# Patient Record
Sex: FEMALE | Race: OTHER | NOT HISPANIC OR LATINO | Employment: UNEMPLOYED | URBAN - METROPOLITAN AREA
[De-identification: names, ages, dates, MRNs, and addresses within clinical notes are randomized per-mention and may not be internally consistent; named-entity substitution may affect disease eponyms.]

---

## 2022-11-18 DIAGNOSIS — M06.9 RHEUMATOID ARTHRITIS: Primary | ICD-10-CM

## 2023-01-12 ENCOUNTER — HOSPITAL ENCOUNTER (OUTPATIENT)
Dept: RADIOLOGY | Facility: OTHER | Age: 48
Discharge: HOME | End: 2023-01-12
Attending: INTERNAL MEDICINE
Payer: MEDICAID

## 2023-01-12 DIAGNOSIS — M13.0 POLYARTHRITIS, UNSPECIFIED: ICD-10-CM

## 2023-01-12 PROCEDURE — 72170 XR PELVIS ROUTINE AP: ICD-10-PCS | Mod: 26,,, | Performed by: RADIOLOGY

## 2023-01-12 PROCEDURE — 73620 X-RAY EXAM OF FOOT: CPT | Mod: 26,50,, | Performed by: RADIOLOGY

## 2023-01-12 PROCEDURE — 73620 XR FOOT AP BILAT: ICD-10-PCS | Mod: 26,50,, | Performed by: RADIOLOGY

## 2023-01-12 PROCEDURE — 72100 X-RAY EXAM L-S SPINE 2/3 VWS: CPT | Mod: 26,,, | Performed by: RADIOLOGY

## 2023-01-12 PROCEDURE — 72100 X-RAY EXAM L-S SPINE 2/3 VWS: CPT | Mod: TC,FY

## 2023-01-12 PROCEDURE — 73130 X-RAY EXAM OF HAND: CPT | Mod: TC,50,FY

## 2023-01-12 PROCEDURE — 73130 XR HAND COMPLETE 3 VIEWS BILATERAL: ICD-10-PCS | Mod: 26,50,, | Performed by: RADIOLOGY

## 2023-01-12 PROCEDURE — 72100 XR LUMBAR SPINE 2 OR 3 VIEWS: ICD-10-PCS | Mod: 26,,, | Performed by: RADIOLOGY

## 2023-01-12 PROCEDURE — 73130 X-RAY EXAM OF HAND: CPT | Mod: 26,50,, | Performed by: RADIOLOGY

## 2023-01-12 PROCEDURE — 72170 X-RAY EXAM OF PELVIS: CPT | Mod: TC,FY

## 2023-01-12 PROCEDURE — 72170 X-RAY EXAM OF PELVIS: CPT | Mod: 26,,, | Performed by: RADIOLOGY

## 2023-01-12 PROCEDURE — 73620 X-RAY EXAM OF FOOT: CPT | Mod: TC,50,FY

## 2023-02-25 ENCOUNTER — HOSPITAL ENCOUNTER (EMERGENCY)
Facility: OTHER | Age: 48
Discharge: HOME | End: 2023-02-26
Attending: EMERGENCY MEDICINE
Payer: MEDICAID

## 2023-02-25 DIAGNOSIS — H53.9 CHANGE IN VISION: ICD-10-CM

## 2023-02-25 DIAGNOSIS — I10 HYPERTENSION, UNSPECIFIED TYPE: ICD-10-CM

## 2023-02-25 DIAGNOSIS — R03.0 ELEVATED BLOOD PRESSURE READING: Primary | ICD-10-CM

## 2023-02-25 LAB
B-HCG UR QL: NEGATIVE
BILIRUB UR QL STRIP: NEGATIVE
CLARITY UR: CLEAR
COLOR UR: COLORLESS
CTP QC/QA: YES
GLUCOSE UR QL STRIP: NEGATIVE
HGB UR QL STRIP: NEGATIVE
KETONES UR QL STRIP: NEGATIVE
LEUKOCYTE ESTERASE UR QL STRIP: NEGATIVE
NITRITE UR QL STRIP: NEGATIVE
PH UR STRIP: 6 [PH] (ref 5–8)
PROT UR QL STRIP: NEGATIVE
SP GR UR STRIP: <1.005 (ref 1–1.03)
URN SPEC COLLECT METH UR: ABNORMAL
UROBILINOGEN UR STRIP-ACNC: NEGATIVE EU/DL

## 2023-02-25 PROCEDURE — 81003 URINALYSIS AUTO W/O SCOPE: CPT | Performed by: EMERGENCY MEDICINE

## 2023-02-25 PROCEDURE — 81025 URINE PREGNANCY TEST: CPT | Performed by: EMERGENCY MEDICINE

## 2023-02-25 PROCEDURE — 99285 EMERGENCY DEPT VISIT HI MDM: CPT

## 2023-02-26 VITALS
RESPIRATION RATE: 16 BRPM | TEMPERATURE: 99 F | OXYGEN SATURATION: 98 % | HEIGHT: 61 IN | SYSTOLIC BLOOD PRESSURE: 177 MMHG | HEART RATE: 65 BPM | DIASTOLIC BLOOD PRESSURE: 85 MMHG

## 2023-02-26 LAB
ALBUMIN SERPL BCP-MCNC: 4.3 G/DL (ref 3.5–5.2)
ALP SERPL-CCNC: 103 U/L (ref 55–135)
ALT SERPL W/O P-5'-P-CCNC: 23 U/L (ref 10–44)
ANION GAP SERPL CALC-SCNC: 14 MMOL/L (ref 8–16)
AST SERPL-CCNC: 18 U/L (ref 10–40)
BASOPHILS # BLD AUTO: 0.05 K/UL (ref 0–0.2)
BASOPHILS NFR BLD: 0.4 % (ref 0–1.9)
BILIRUB SERPL-MCNC: 0.5 MG/DL (ref 0.1–1)
BNP SERPL-MCNC: <10 PG/ML (ref 0–99)
BUN SERPL-MCNC: 12 MG/DL (ref 6–20)
CALCIUM SERPL-MCNC: 9.9 MG/DL (ref 8.7–10.5)
CHLORIDE SERPL-SCNC: 98 MMOL/L (ref 95–110)
CO2 SERPL-SCNC: 23 MMOL/L (ref 23–29)
CREAT SERPL-MCNC: 0.8 MG/DL (ref 0.5–1.4)
DIFFERENTIAL METHOD: ABNORMAL
EOSINOPHIL # BLD AUTO: 0.4 K/UL (ref 0–0.5)
EOSINOPHIL NFR BLD: 2.8 % (ref 0–8)
ERYTHROCYTE [DISTWIDTH] IN BLOOD BY AUTOMATED COUNT: 14.4 % (ref 11.5–14.5)
EST. GFR  (NO RACE VARIABLE): >60 ML/MIN/1.73 M^2
GLUCOSE SERPL-MCNC: 103 MG/DL (ref 70–110)
HCT VFR BLD AUTO: 32.4 % (ref 37–48.5)
HGB BLD-MCNC: 10.7 G/DL (ref 12–16)
IMM GRANULOCYTES # BLD AUTO: 0.03 K/UL (ref 0–0.04)
IMM GRANULOCYTES NFR BLD AUTO: 0.2 % (ref 0–0.5)
LIPASE SERPL-CCNC: 43 U/L (ref 4–60)
LYMPHOCYTES # BLD AUTO: 5.1 K/UL (ref 1–4.8)
LYMPHOCYTES NFR BLD: 38.6 % (ref 18–48)
MCH RBC QN AUTO: 26.8 PG (ref 27–31)
MCHC RBC AUTO-ENTMCNC: 33 G/DL (ref 32–36)
MCV RBC AUTO: 81 FL (ref 82–98)
MONOCYTES # BLD AUTO: 0.7 K/UL (ref 0.3–1)
MONOCYTES NFR BLD: 5.6 % (ref 4–15)
NEUTROPHILS # BLD AUTO: 6.9 K/UL (ref 1.8–7.7)
NEUTROPHILS NFR BLD: 52.4 % (ref 38–73)
NRBC BLD-RTO: 0 /100 WBC
PLATELET # BLD AUTO: 350 K/UL (ref 150–450)
PMV BLD AUTO: 10.1 FL (ref 9.2–12.9)
POTASSIUM SERPL-SCNC: 3.7 MMOL/L (ref 3.5–5.1)
PROT SERPL-MCNC: 7.5 G/DL (ref 6–8.4)
RBC # BLD AUTO: 3.99 M/UL (ref 4–5.4)
SODIUM SERPL-SCNC: 135 MMOL/L (ref 136–145)
TROPONIN I SERPL DL<=0.01 NG/ML-MCNC: <0.006 NG/ML (ref 0–0.03)
TSH SERPL DL<=0.005 MIU/L-ACNC: 2.74 UIU/ML (ref 0.4–4)
WBC # BLD AUTO: 13.12 K/UL (ref 3.9–12.7)

## 2023-02-26 PROCEDURE — 84484 ASSAY OF TROPONIN QUANT: CPT | Performed by: EMERGENCY MEDICINE

## 2023-02-26 PROCEDURE — 25000003 PHARM REV CODE 250: Performed by: EMERGENCY MEDICINE

## 2023-02-26 PROCEDURE — 83880 ASSAY OF NATRIURETIC PEPTIDE: CPT | Performed by: EMERGENCY MEDICINE

## 2023-02-26 PROCEDURE — 93010 EKG 12-LEAD: ICD-10-PCS | Mod: ,,, | Performed by: INTERNAL MEDICINE

## 2023-02-26 PROCEDURE — 93010 ELECTROCARDIOGRAM REPORT: CPT | Mod: ,,, | Performed by: INTERNAL MEDICINE

## 2023-02-26 PROCEDURE — 83690 ASSAY OF LIPASE: CPT | Performed by: EMERGENCY MEDICINE

## 2023-02-26 PROCEDURE — 80053 COMPREHEN METABOLIC PANEL: CPT | Performed by: EMERGENCY MEDICINE

## 2023-02-26 PROCEDURE — 93005 ELECTROCARDIOGRAM TRACING: CPT

## 2023-02-26 PROCEDURE — 84443 ASSAY THYROID STIM HORMONE: CPT | Performed by: EMERGENCY MEDICINE

## 2023-02-26 PROCEDURE — 85025 COMPLETE CBC W/AUTO DIFF WBC: CPT | Performed by: EMERGENCY MEDICINE

## 2023-02-26 RX ORDER — AMLODIPINE BESYLATE 2.5 MG/1
2.5 TABLET ORAL DAILY
Qty: 30 TABLET | Refills: 5 | Status: SHIPPED | OUTPATIENT
Start: 2023-02-26 | End: 2023-02-26 | Stop reason: SDUPTHER

## 2023-02-26 RX ORDER — AMLODIPINE BESYLATE 2.5 MG/1
2.5 TABLET ORAL DAILY
Qty: 30 TABLET | Refills: 5 | Status: SHIPPED | OUTPATIENT
Start: 2023-02-26 | End: 2024-02-26

## 2023-02-26 RX ORDER — TETRACAINE HYDROCHLORIDE 5 MG/ML
2 SOLUTION OPHTHALMIC
Status: COMPLETED | OUTPATIENT
Start: 2023-02-26 | End: 2023-02-26

## 2023-02-26 RX ADMIN — TETRACAINE HYDROCHLORIDE 2 DROP: 5 SOLUTION OPHTHALMIC at 01:02

## 2023-02-26 RX ADMIN — FLUORESCEIN SODIUM 1 EACH: 1 STRIP OPHTHALMIC at 01:02

## 2023-02-26 NOTE — ED TRIAGE NOTES
Augustina used, pt speaks Alejandra  Pt hx: HLD, HTN, DM2  Pt reports that she has been taking her BP at home, was getting elevated readings (170/96) x1 month, worse reading x 3 days. +Nausea. Denies CP/SOB.     Pt complains of eye irritation x 4 days.    Pt takes losartan/HCTZ. Atorvastatin 10mg, Duloxetine 20mg omerpozole 20mg metformin 500mg BID, Meloxicam 15mg

## 2023-02-26 NOTE — ED PROVIDER NOTES
"Encounter Date: 2/25/2023       History     Chief Complaint   Patient presents with    Eye Pain     Reports right eye pain x 1 week.     Abdominal Pain    Vomiting     X 3 days    Weakness     49 yo female with HTN, DLD, and DM2 presents via son to [facility] with elevated blood pressure associated with nausea, and vision changes to R eye.      Patient reports her normal blood pressure is 1  systolic.  Over the last 1 month, patient has noted elevated blood pressures at home, and these have been worse over the last 3 days.  Blood pressure at home yesterday was 170/96.  Patient has been nauseated for the last few days with increase in blood pressure.  No abdominal or chest pain.  No shortness of breath.  No headaches.  Patient's primary care provider, [facility], increased her blood pressure medication about 2 weeks ago (2/14/23) without effect.  ([facility] increased Losartan/HCTZ from 50/12.5mg PO qday to 100/12.5mg PO qday.)  Patient also reports "feeling hot" intermittently over the last month.      Patient states she saw [facility] for eye exam ?10 days ago.  She had drops placed to eye per son which caused her to be very sensitive to light for the rest of the day.  This improved.  However, subsequently, about 5 days ago, patient developed spots to vision in R eye.  She states these move when she looks around; however her vision is not blurry, she does not have tearing or photosensitity, the eye is not painful, and she has no eye redness.  Patient went to [facility] again Friday 2/24/23 and was told to seek emergent eye care.  Patient is very worried about losing vison in R eye.    Patient is Alejandra-speaking.  [employer] translation used intially, Julian [id number]; however connection was lost and then son translated for patient.     History is challenging due to language barrier despite [employer] and family translation.      Review of patient's allergies indicates:  No Known Allergies  No past medical history on " file.  No past surgical history on file.  No family history on file.     Review of Systems   Constitutional:  Negative for fever.   HENT:  Negative for sore throat.    Eyes:  Positive for visual disturbance.   Respiratory:  Negative for shortness of breath.    Cardiovascular:  Negative for chest pain.   Gastrointestinal:  Positive for nausea. Negative for abdominal pain and vomiting.   Genitourinary:  Negative for dysuria.   Musculoskeletal:  Negative for back pain, neck pain and neck stiffness.   Skin:  Negative for rash.   Neurological:  Negative for syncope.     Physical Exam     Initial Vitals [02/25/23 2236]   BP Pulse Resp Temp SpO2   (!) 190/92 70 18 99.3 °F (37.4 °C) 97 %      MAP       --         Physical Exam    Nursing note and vitals reviewed.  Constitutional: She appears well-developed and well-nourished. She is not diaphoretic.   Awake, alert, nontoxic.   HENT:   Head: Normocephalic and atraumatic.   Mouth/Throat: Oropharynx is clear and moist.   Eyes: Conjunctivae and EOM are normal. Pupils are equal, round, and reactive to light.   No corneal abrasion/ulcer on fluorescein/tetracaine bilaterally.  Fundoscopic exam reassuring bilaterally.   IOP R 14, IOP L 13 on tonopen.   Neck: Neck supple.   Normal range of motion.  Cardiovascular:  Normal rate, regular rhythm, normal heart sounds and intact distal pulses.           No murmur heard.  Pulmonary/Chest: Breath sounds normal. No respiratory distress. She has no wheezes. She has no rhonchi. She has no rales.   Abdominal: Abdomen is soft. There is no abdominal tenderness. There is no rebound and no guarding.   Musculoskeletal:         General: No tenderness or edema. Normal range of motion.      Cervical back: Normal range of motion and neck supple.     Neurological: She is alert and oriented to person, place, and time. She has normal strength.   Moving all extremities.   Skin: Skin is warm and dry. No erythema. There is pallor.   Psychiatric: She has a  normal mood and affect.       ED Course   Procedures  Labs Reviewed   URINALYSIS, REFLEX TO URINE CULTURE - Abnormal; Notable for the following components:       Result Value    Color, UA Colorless (*)     Specific Gravity, UA <1.005 (*)     All other components within normal limits    Narrative:     Specimen Source->Urine   CBC W/ AUTO DIFFERENTIAL - Abnormal; Notable for the following components:    WBC 13.12 (*)     RBC 3.99 (*)     Hemoglobin 10.7 (*)     Hematocrit 32.4 (*)     MCV 81 (*)     MCH 26.8 (*)     Lymph # 5.1 (*)     All other components within normal limits   COMPREHENSIVE METABOLIC PANEL - Abnormal; Notable for the following components:    Sodium 135 (*)     All other components within normal limits   B-TYPE NATRIURETIC PEPTIDE   TSH   TROPONIN I   LIPASE   LIPASE   POCT URINE PREGNANCY     EKG Readings: (Independently Interpreted)   02:01: NSR, HR 62. Normal axis. No ectopy. No STEMI.      Imaging Results              CT Head Without Contrast (Final result)  Result time 02/26/23 03:03:10      Final result by Arelis Kruse MD (02/26/23 03:03:10)                   Impression:      No CT evidence of acute intracranial abnormality.  If there is clinical concern for acute ischemia, further assessment with MRI is advised if there are no clinical contraindications.      Electronically signed by: Arelis Kruse MD  Date:    02/26/2023  Time:    03:03               Narrative:    EXAMINATION:  CT HEAD WITHOUT CONTRAST    CLINICAL HISTORY:  Neuro deficit, acute, stroke suspected;    TECHNIQUE:  Low dose axial images were obtained through the head.  Coronal and sagittal reformations were also performed. Contrast was not administered.    COMPARISON:  None.    FINDINGS:  There is no acute intracranial hemorrhage, hydrocephalus, midline shift or mass effect. Gray-white matter differentiation appears maintained. The basal cisterns are patent. The visualized paranasal sinuses mastoid air cells are essentially  clear noting opacification of a anterior right ethmoid air cell.  The visualized bones of the calvarium demonstrate no acute osseous abnormality.                                       X-Ray Chest PA And Lateral (Final result)  Result time 02/26/23 02:38:37      Final result by Arelis Kruse MD (02/26/23 02:38:37)                   Impression:      Please see above.      Electronically signed by: Arelis Kruse MD  Date:    02/26/2023  Time:    02:38               Narrative:    EXAMINATION:  XR CHEST PA AND LATERAL    CLINICAL HISTORY:  Elevated blood-pressure reading, without diagnosis of hypertension    TECHNIQUE:  PA and lateral views of the chest were performed.    COMPARISON:  None    FINDINGS:  The cardiomediastinal silhouette is within normal limits of size and configuration.  Mediastinal structures are midline.  There is asymmetric elevation of the right hemidiaphragm.  There is a 2.1 cm calcific density best appreciated on the lateral view which could reflect a calcified hilar lymph node.  Further assessment with CT can be performed.  No confluent airspace consolidation, substantial volume of pleural fluid or pneumothorax identified.  Visualized osseous structures are intact.                                    X-Rays:   Independently Interpreted Readings:   Other Readings:  CXR NAD. R hemidiaphragm elevation. Calcified R hilar node.   Medications   TETRAcaine HCl (PF) 0.5 % Drop 2 drop (2 drops Right Eye Given 2/26/23 0139)   fluorescein ophthalmic strip 1 each (1 each Right Eye Given 2/26/23 0139)     Medical Decision Making:   History:   Old Medical Records: I decided to obtain old medical records.  Old Records Summarized: records from another hospital.  Initial Assessment:   48 y.o. female with recently elevated BP despite compliance with home meds, and R eye change in vision since prior eye exam.  Differential Diagnosis:   Ddx includes TRI, poorly controlled HTN, corneal abrasion/ulcer, acute angle  closure glaucoma, new floaters, retinal detachment, vitreous hemorrhage, other.  Independently Interpreted Test(s):   I have ordered and independently interpreted X-rays - see prior notes.  I have ordered and independently interpreted EKG Reading(s) - see prior notes  Clinical Tests:   Lab Tests: Ordered and Reviewed  Radiological Study: Ordered and Reviewed  Medical Tests: Ordered and Reviewed  ED Management:  UPT negative.    EKG no STEMI.    CXR NAD.    CT head NAD.    Labs: WBC 13.12. Hgb 10.7. CMP, lipase, troponin, TSH, UA reassuring.     As noted, eye exam reassuring.    BP did decline spontaneously in ER to 159/88 but then came up again to 177/85.    I suspect poorly-controlled HTN.  I have started patient on low dose amlodipine which she will take in addition to losartan-HCTZ.  She can continue to monitor BP at home.    No evidence of serious R eye pathology on exam here to explain new ?floaters.  However I have stressed to patient's son that she needs to f/u to an eye specialist this week for reevaluation.    Copies of ER workup provided to patient.                        Clinical Impression:   Final diagnoses:  [R03.0] Elevated blood pressure reading (Primary)  [I10] Hypertension, unspecified type  [H53.9] Change in vision - right eye        ED Disposition Condition    Discharge Stable          ED Prescriptions       Medication Sig Dispense Start Date End Date Auth. Provider    amLODIPine (NORVASC) 2.5 MG tablet  (Status: Discontinued) Take 1 tablet (2.5 mg total) by mouth once daily. 30 tablet 2/26/2023 2/26/2023 Caryn Reese MD    amLODIPine (NORVASC) 2.5 MG tablet Take 1 tablet (2.5 mg total) by mouth once daily. 30 tablet 2/26/2023 2/26/2024 Caryn Reese MD          Follow-up Information       Follow up With Specialties Details Why Contact Info Additional Information    [facility] - Nabil [facility]    [address] [facility] [address]  [phone]       [facility] - [facility]  Ophthalmology   [address] [facility] [address]  [phone] Ophthalmology - [facility], 3rd Floor, [address] Please park in [place] and use [place] elevators             Cayrn Reese MD  02/26/23 1016